# Patient Record
Sex: MALE | Race: WHITE | Employment: OTHER | URBAN - NONMETROPOLITAN AREA
[De-identification: names, ages, dates, MRNs, and addresses within clinical notes are randomized per-mention and may not be internally consistent; named-entity substitution may affect disease eponyms.]

---

## 2021-08-03 ENCOUNTER — OFFICE VISIT (OUTPATIENT)
Dept: OTOLARYNGOLOGY | Facility: OTHER | Age: 71
End: 2021-08-03
Attending: OTOLARYNGOLOGY
Payer: MEDICARE

## 2021-08-03 DIAGNOSIS — R09.82 POST-NASAL DRAINAGE: Primary | ICD-10-CM

## 2021-08-03 PROCEDURE — G0463 HOSPITAL OUTPT CLINIC VISIT: HCPCS

## 2021-08-06 NOTE — PROGRESS NOTES
document embedded image  Patient Name: Shakir Garrett    Address: [address]    YOB: 1950    [place] [id number]    MR Number: [id number]    Phone: [phone]  PCP: Gretchen Luis MD            Appointment Date: 08/03/21   Visit Provider: Fransisco Lerma MD    cc: Gretchen Luis MD; ~    ENT Progress Note  Visit Reasons: Phlem in throat, Clearing    HPI  History of Present Illness  Chief complaint:  Thick phlegm in hypopharynx    History  The patient is a 70-year-old man who comes to the office today with complaints of thick sticky secretions in his hypopharynx.  He denies any purulence nasal drainage, midfacial pain, or headache.  He denies acid reflux.  He had had upper GI endoscopy by Dr. Thomas.  He was told he had some yeast infection in his esophagus.  He was visually treated with some prolonged trials of fluconazole.    Exam  Nasal-no purulence or obstruction noted  Oral cavity oropharynx-no inflammation or yeast or mucosal abnormality appreciated  Neck-no palpable masses or adenopathy  Indirect laryngoscopy-he has a neurologically intact larynx without mucosal lesion.  His hypopharynx is clear.  Head and neck integument-unremarkable  General-the patient appears well and in no distress  Neuro-there are no focal cranial nerve deficits    Home Medications     - Last Reconciled 08/05/21 by Lupe Almeida, Med Assist    fluticasone propionate 50 mcg/actuation nasal spray,suspension (Flonase Allergy Relief)  2 sprays intranasal DAILY  levothyroxine 50 mcg tablet  50 mcg PO DAILY  loratadine 10 mg tablet (Claritin)  10 mg PO DAILY PRN  multivitamin   1 tab PO DAILY  omeprazole 40 mg capsule,delayed release  40 mg PO DAILY 90 days  psyllium husk (aspartame) 3.4 gram/5.8 gram oral powder (Metamucil Sugar-Free (aspartame))  2.987251 grams PO BID  tamsulosin 0.4 mg capsule  0.4 mg PO BID  tizanidine 4 mg tablet (Zanaflex)  4 mg PO Q8H PRN    Allergies    ibuprofen  Allergy (Unverified 21 09:05)  Unknown  cetirizine [From Zyrtec] Adverse Reaction (Severe, Verified 21 09:05)  Itching all over  lansoprazole Adverse Reaction (Intermediate, Verified 21 09:05)  achiness/myalgia  pantoprazole Adverse Reaction (Intermediate, Verified 21 09:05)  Itching all over  Statins-Hmg-Coa Reductase Inhibitor Adverse Reaction (Intermediate, Verified 21 09:05)  Myalgias    PFSH  PFSH:   Medical History (Reviewed 21 @ 09:06 by Lupe Almeida, Med Assist)    Atrophy of thyroid  BPH without obstruction/lower urinary tract symptoms  Degenerative disc disease  2006-bilateral nerve root compressions in L4-5, L5-S1 with pain and right lower extremity weakness  Elevated LDL cholesterol level  GERD with esophagitis  Headache  Hemorrhoids  History of actinic keratoses  History of cardiovascular stress test  in his 40s-normal  History of echocardiogram  -nl  Sigmoidoscopy performed  2018-internal hemorrhoids, diverticulosis  Tinnitus    Surgical History (Reviewed 21 @ 09:06 by Lupe Almeida, Med Assist)    History of colonoscopy  2015-normal  2020 ([facility]) Hemorrhoids repeat 5 yrs  History of esophagogastroduodenoscopy (EGD)  2015-GERD, atrophic gastropathy, atrophic duodenopathy  2016([facility])-atrophic gastropathy, esophagitis, hiatal hernia, no abnormality-biopsies of gastric mucosa and mid-esophageal biopsies;  2020 ([facility]) diaphagmatic hernia, esophagitis, erosive gastropathy barretts esophagus at 40 cm neg for dysplasia  2021-EGD, Barretts esophagus  History of sigmoidoscopy  2018([facility])-internal hemorrhoids, diverticulosis- colonoscopy in 5 years  History of testicular surgery    Cyst removed from right     Family History (Reviewed 21 @ 09:06 by Lupe Almeida, Med Assist)  Father   ,  , diagnosed with Cancer  COPD (chronic obstructive pulmonary disease)  Non Hodgkin's  lymphoma  Uncle - Paternal  Cancer  Mother  Diagnosis unknown       Old age, mother not known to patient  Brother     No problems noted.    Sister     No problems noted.    Daughter     No problems noted.       Social History (Reviewed 08/05/21 @ 09:06 by Lupe Almeida, Med Assist)  Smoking Status:  Former smoker quit date: 01/01/85  how long ago did patient quit smoking:  stop smoking ?: 1985  second hand exposure:  No  alcohol intake:  current alcohol intake frequency: a few times a week  details:  ALCOHOL USE moderate   Alcohol: Points: 4, Interpretation: Positive  substance use type:  does not use  marital status:    current occupational status:  employed  current occupation:  [employer]       A&P  Assessment & Plan  (1) Post-nasal drainage:        Status: Acute        Code(s):  R09.82 - Postnasal drip  Additional Plan Details  Plan Details: He was counseled on strategies to help with thick postnasal secretions.  He was reassured I see no active disease process on his head neck exam at this time.      Fransisco Lerma MD    08/03/21 0900    <Electronically signed by Fransisco Lerma MD> 08/05/21 [id number]